# Patient Record
Sex: MALE | Race: AMERICAN INDIAN OR ALASKA NATIVE | NOT HISPANIC OR LATINO | Employment: FULL TIME | URBAN - METROPOLITAN AREA
[De-identification: names, ages, dates, MRNs, and addresses within clinical notes are randomized per-mention and may not be internally consistent; named-entity substitution may affect disease eponyms.]

---

## 2020-02-17 ENCOUNTER — HOSPITAL ENCOUNTER (EMERGENCY)
Facility: MEDICAL CENTER | Age: 55
End: 2020-02-17
Attending: EMERGENCY MEDICINE

## 2020-02-17 VITALS
WEIGHT: 166.67 LBS | DIASTOLIC BLOOD PRESSURE: 126 MMHG | SYSTOLIC BLOOD PRESSURE: 181 MMHG | BODY MASS INDEX: 24.69 KG/M2 | RESPIRATION RATE: 18 BRPM | TEMPERATURE: 98.3 F | HEIGHT: 69 IN | OXYGEN SATURATION: 99 % | HEART RATE: 97 BPM

## 2020-02-17 DIAGNOSIS — R42 LIGHTHEADEDNESS: ICD-10-CM

## 2020-02-17 DIAGNOSIS — H61.21 IMPACTED CERUMEN OF RIGHT EAR: ICD-10-CM

## 2020-02-17 DIAGNOSIS — I10 HYPERTENSION, UNSPECIFIED TYPE: ICD-10-CM

## 2020-02-17 LAB
ANION GAP SERPL CALC-SCNC: 15 MMOL/L (ref 7–16)
BASOPHILS # BLD AUTO: 1.1 % (ref 0–1.8)
BASOPHILS # BLD: 0.1 K/UL (ref 0–0.12)
BUN SERPL-MCNC: 13 MG/DL (ref 8–22)
CALCIUM SERPL-MCNC: 9.6 MG/DL (ref 8.4–10.2)
CHLORIDE SERPL-SCNC: 103 MMOL/L (ref 96–112)
CO2 SERPL-SCNC: 20 MMOL/L (ref 20–33)
CREAT SERPL-MCNC: 0.73 MG/DL (ref 0.5–1.4)
EKG IMPRESSION: NORMAL
EOSINOPHIL # BLD AUTO: 0.13 K/UL (ref 0–0.51)
EOSINOPHIL NFR BLD: 1.5 % (ref 0–6.9)
ERYTHROCYTE [DISTWIDTH] IN BLOOD BY AUTOMATED COUNT: 38.3 FL (ref 35.9–50)
GLUCOSE SERPL-MCNC: 107 MG/DL (ref 65–99)
HCT VFR BLD AUTO: 56.1 % (ref 42–52)
HGB BLD-MCNC: 19.2 G/DL (ref 14–18)
IMM GRANULOCYTES # BLD AUTO: 0.03 K/UL (ref 0–0.11)
IMM GRANULOCYTES NFR BLD AUTO: 0.3 % (ref 0–0.9)
LYMPHOCYTES # BLD AUTO: 1.08 K/UL (ref 1–4.8)
LYMPHOCYTES NFR BLD: 12.1 % (ref 22–41)
MCH RBC QN AUTO: 29.6 PG (ref 27–33)
MCHC RBC AUTO-ENTMCNC: 34.2 G/DL (ref 33.7–35.3)
MCV RBC AUTO: 86.6 FL (ref 81.4–97.8)
MONOCYTES # BLD AUTO: 0.5 K/UL (ref 0–0.85)
MONOCYTES NFR BLD AUTO: 5.6 % (ref 0–13.4)
NEUTROPHILS # BLD AUTO: 7.08 K/UL (ref 1.82–7.42)
NEUTROPHILS NFR BLD: 79.4 % (ref 44–72)
NRBC # BLD AUTO: 0 K/UL
NRBC BLD-RTO: 0 /100 WBC
PLATELET # BLD AUTO: 238 K/UL (ref 164–446)
PMV BLD AUTO: 10.4 FL (ref 9–12.9)
POTASSIUM SERPL-SCNC: 3.9 MMOL/L (ref 3.6–5.5)
RBC # BLD AUTO: 6.48 M/UL (ref 4.7–6.1)
SODIUM SERPL-SCNC: 138 MMOL/L (ref 135–145)
TROPONIN T SERPL-MCNC: 8 NG/L (ref 6–19)
WBC # BLD AUTO: 8.9 K/UL (ref 4.8–10.8)

## 2020-02-17 PROCEDURE — 700102 HCHG RX REV CODE 250 W/ 637 OVERRIDE(OP): Performed by: EMERGENCY MEDICINE

## 2020-02-17 PROCEDURE — 84484 ASSAY OF TROPONIN QUANT: CPT

## 2020-02-17 PROCEDURE — 85025 COMPLETE CBC W/AUTO DIFF WBC: CPT

## 2020-02-17 PROCEDURE — 99284 EMERGENCY DEPT VISIT MOD MDM: CPT

## 2020-02-17 PROCEDURE — 80048 BASIC METABOLIC PNL TOTAL CA: CPT

## 2020-02-17 PROCEDURE — A9270 NON-COVERED ITEM OR SERVICE: HCPCS | Performed by: EMERGENCY MEDICINE

## 2020-02-17 PROCEDURE — 700111 HCHG RX REV CODE 636 W/ 250 OVERRIDE (IP): Performed by: EMERGENCY MEDICINE

## 2020-02-17 PROCEDURE — 93005 ELECTROCARDIOGRAM TRACING: CPT | Performed by: EMERGENCY MEDICINE

## 2020-02-17 RX ORDER — HYDROCHLOROTHIAZIDE 12.5 MG/1
12.5 CAPSULE, GELATIN COATED ORAL DAILY
Qty: 30 CAP | Refills: 0 | Status: SHIPPED | OUTPATIENT
Start: 2020-02-17 | End: 2020-02-17

## 2020-02-17 RX ORDER — DEXAMETHASONE SODIUM PHOSPHATE 10 MG/ML
10 INJECTION, SOLUTION INTRAMUSCULAR; INTRAVENOUS ONCE
Status: COMPLETED | OUTPATIENT
Start: 2020-02-17 | End: 2020-02-17

## 2020-02-17 RX ORDER — DEXAMETHASONE SODIUM PHOSPHATE 10 MG/ML
INJECTION, SOLUTION INTRAMUSCULAR; INTRAVENOUS
Status: DISCONTINUED
Start: 2020-02-17 | End: 2020-02-18 | Stop reason: HOSPADM

## 2020-02-17 RX ORDER — CETIRIZINE HYDROCHLORIDE 10 MG/1
10 TABLET ORAL ONCE
Status: COMPLETED | OUTPATIENT
Start: 2020-02-17 | End: 2020-02-17

## 2020-02-17 RX ORDER — CETIRIZINE HYDROCHLORIDE 10 MG/1
10 TABLET ORAL
Qty: 30 TAB | Refills: 0 | Status: SHIPPED | OUTPATIENT
Start: 2020-02-17

## 2020-02-17 RX ORDER — HYDROCHLOROTHIAZIDE 25 MG/1
12.5 TABLET ORAL ONCE
Status: COMPLETED | OUTPATIENT
Start: 2020-02-17 | End: 2020-02-17

## 2020-02-17 RX ORDER — LISINOPRIL 5 MG/1
10 TABLET ORAL ONCE
Status: COMPLETED | OUTPATIENT
Start: 2020-02-17 | End: 2020-02-17

## 2020-02-17 RX ORDER — LISINOPRIL 10 MG/1
10 TABLET ORAL DAILY
Qty: 30 TAB | Refills: 0 | Status: SHIPPED | OUTPATIENT
Start: 2020-02-17

## 2020-02-17 RX ADMIN — DEXAMETHASONE SODIUM PHOSPHATE 10 MG: 10 INJECTION INTRAMUSCULAR; INTRAVENOUS at 21:08

## 2020-02-17 RX ADMIN — HYDROCHLOROTHIAZIDE 12.5 MG: 25 TABLET ORAL at 18:15

## 2020-02-17 RX ADMIN — LISINOPRIL 10 MG: 5 TABLET ORAL at 19:30

## 2020-02-17 RX ADMIN — CETIRIZINE HYDROCHLORIDE 10 MG: 10 TABLET, FILM COATED ORAL at 18:55

## 2020-02-17 SDOH — HEALTH STABILITY: MENTAL HEALTH: HOW OFTEN DO YOU HAVE A DRINK CONTAINING ALCOHOL?: NEVER

## 2020-02-18 NOTE — DISCHARGE INSTRUCTIONS
You were seen in the emergency department for high blood pressure.  Your physical, neurologic exams were reassuring.  Your labs were also reassuring.  It is important that you start a blood pressure medication and take this as prescribed.  It is important that you also follow-up with a primary care physician.  Our  has been contacted to help arrange for follow-up for you.    You were given a dose of a medication called hydrochlorothiazide, which appeared to cause an allergic reaction.  This has been added to your allergy list in the SERPs system.  You should avoid this medication in the future.    Please call the Medicaid office in [place] to see if you qualify.    Please return to the emergency department or seek medical attention if you develop:  Severe persistent headache, numbness in any part of your body, weakness in any part of your body, ongoing dizziness, chest pain, any other new or concerning findings

## 2020-02-18 NOTE — ED TRIAGE NOTES
Patient c/o dizziness and episodic HA for 1 week. Patient check BP today at work and it was 188/118. Patient has no PMH but admits that he never goes to a doctor. Patient denies CP.

## 2020-02-18 NOTE — ED PROVIDER NOTES
ED Provider Note      Means of Arrival: Private vehicle  History obtained from: Patient      CHIEF COMPLAINT  Chief Complaint   Patient presents with   • Hypertension   • Dizziness       HPI  Navdeep Vargas is a 54 y.o. male who presents with high blood pressure, dizziness, intermittent headaches.  Patient reports he has a history of intermittent headaches for multiple years that usually get better with sleep.  He has had no changes with these.  This morning, he had onset of disequilibrium the last approximate 2.5 hours before resolving spontaneously.  He reports that he gets episodes like this in the past with work and they have always resolved.  He denies any current headache, dizziness, weakness, paresthesias.  He denies any chest pain at any time.  He reports that he used his coworkers blood pressure cuff and was found to have a high blood pressure, for which she was advised to go to the emergency department.  He does not have a regular doctor.  He does not take any medications.    REVIEW OF SYSTEMS  CONSTITUTIONAL:  No fever.  CARDIOVASCULAR:  No chest discomfort.  RESPIRATORY:  No pleuritic chest pain.  GASTROINTESTINAL:  No abdominal pain.  GENITOURINARY:   No dysuria.  MUSCULOSKELETAL:  No arthralgia.  SKIN:  No rash or suspicious lesions.  NEUROLOGIC:    See HPI  ENDOCRINE:  No facial edema.  HEMATOLOGIC:  No abnormal bleeding.       See HPI for further details.   All other systems are negative.     PAST MEDICAL HISTORY  No past medical history on file.    FAMILY HISTORY  No family history on file.    SOCIAL HISTORY   reports that he has never smoked. He has never used smokeless tobacco. He reports that he does not drink alcohol or use drugs.    SURGICAL HISTORY  No past surgical history on file.    CURRENT MEDICATIONS  Home Medications    **Home medications have not yet been reviewed for this encounter**         ALLERGIES  Allergies   Allergen Reactions   • Hydrochlorothiazide Hives       PHYSICAL  "EXAM  VITAL SIGNS: BP (!) 181/126   Pulse 97   Temp 36.8 °C (98.3 °F) (Temporal)   Resp 18   Ht 1.753 m (5' 9\")   Wt 75.6 kg (166 lb 10.7 oz)   SpO2 99%   BMI 24.61 kg/m²    Con: Comfortable  HENT: Normocephalic, atraumatic, Oropharynx within normal limits.  Right cerumen impaction  Eyes: Pupils equal, round and reactive to light, Extraocular muscles intact  Resp: Clear to auscultation, no wheezes, rales or crackles  CV: Regular Rate and Rhythm, Normal first and second heart sounds, no murmurs, rubs or gallups.  Abd: Soft, Nontender, Nondistended, no rebound or guarding.  : No costovertebral angle tenderness  MSK: No deformities  Skin: No rash, Warm and dry, No petechiae  Neuro: Alert and oriented. Cranial nerves II through XII intact, 5+ strength in all extremities, sensation intact in all extremities, speech fluent, cerebellar function intact. NIHSS 0  Psych: Normal mood/mentation      LABS  Labs Reviewed   CBC WITH DIFFERENTIAL - Abnormal; Notable for the following components:       Result Value    RBC 6.48 (*)     Hemoglobin 19.2 (*)     Hematocrit 56.1 (*)     Neutrophils-Polys 79.40 (*)     Lymphocytes 12.10 (*)     All other components within normal limits   BASIC METABOLIC PANEL - Abnormal; Notable for the following components:    Glucose 107 (*)     All other components within normal limits   TROPONIN   ESTIMATED GFR        EKG  Results for orders placed or performed during the hospital encounter of 20   EKG (NOW)   Result Value Ref Range    Report       [facility] Emergency Dept.    Test Date:  2020  Pt Name:    ALON MILLER                  Department: [facility]  MRN:        [id number]                      Room:       [facility]  Gender:     Male                         Technician: [id number]  :        1965                   Requested By:JULIO CESAR MYERS  Order #:    [id number]                    Reading MD: Julio Cesar Myers    Measurements  Intervals                        "         Axis  Rate:       98                           P:          73  VT:         148                          QRS:        58  QRSD:       96                           T:          6  QT:         340  QTc:        435    Interpretive Statements  Sinus rhythm  No previous ECG available for comparison  Electronically Signed On 2- 20:45:38 PST by Jose Myers          COURSE & MEDICAL DECISION MAKING  Pertinent Labs & Imaging studies reviewed. (See chart for details)    Patient presents with high blood pressure.  He has no ongoing symptoms to suggest stroke, ACS.  Basic labs were sent by triage.  Will obtain EKG.  Given his markedly elevated blood pressure, will start the patient on blood pressure medications, refer him to primary care follow-up.  Low suspicion for TIA or hypertensive encephalopathy.  Patient's symptoms are consistent with his prior episodes and have been unchanged over the past few years.    6:37 PM  Patient now developing hives after being provided with hydrochlorothiazide.  We will add this to his allergies list and change class of medication.  Will provide antihistamine relief.  No evidence of anaphylaxis at this time.    Patient's hives continue to improve, no indication for epinephrine at this time.  Patient will be trialed on lisinopril as an alternate blood pressure therapy.  The ED  was called to arrange for follow-up.  Patient reports he does not have insurance and is advised to call the Medicaid office to see if he qualifies.  He has no ongoing signs or symptoms to suggest complication from his elevated blood pressure.  No evidence of renal dysfunction.      FINAL IMPRESSION  1. Hypertension, unspecified type    2. Impacted cerumen of right ear    3. Lightheadedness